# Patient Record
Sex: MALE | Race: WHITE | NOT HISPANIC OR LATINO | Employment: FULL TIME | URBAN - METROPOLITAN AREA
[De-identification: names, ages, dates, MRNs, and addresses within clinical notes are randomized per-mention and may not be internally consistent; named-entity substitution may affect disease eponyms.]

---

## 2024-07-10 ENCOUNTER — HOSPITAL ENCOUNTER (EMERGENCY)
Facility: HOSPITAL | Age: 49
Discharge: HOME | End: 2024-07-11
Payer: COMMERCIAL

## 2024-07-10 VITALS
SYSTOLIC BLOOD PRESSURE: 137 MMHG | HEART RATE: 75 BPM | TEMPERATURE: 97.3 F | HEIGHT: 73 IN | DIASTOLIC BLOOD PRESSURE: 79 MMHG | OXYGEN SATURATION: 99 % | BODY MASS INDEX: 30.48 KG/M2 | WEIGHT: 230 LBS

## 2024-07-10 DIAGNOSIS — M25.572 ACUTE LEFT ANKLE PAIN: Primary | ICD-10-CM

## 2024-07-10 DIAGNOSIS — S93.402A SPRAIN OF LEFT ANKLE, INITIAL ENCOUNTER: ICD-10-CM

## 2024-07-10 PROCEDURE — 99283 EMERGENCY DEPT VISIT LOW MDM: CPT

## 2024-07-10 ASSESSMENT — PAIN DESCRIPTION - PAIN TYPE: TYPE: ACUTE PAIN

## 2024-07-10 ASSESSMENT — PAIN DESCRIPTION - LOCATION: LOCATION: ANKLE

## 2024-07-10 ASSESSMENT — PAIN SCALES - GENERAL: PAINLEVEL_OUTOF10: 10 - WORST POSSIBLE PAIN

## 2024-07-10 ASSESSMENT — COLUMBIA-SUICIDE SEVERITY RATING SCALE - C-SSRS
6. HAVE YOU EVER DONE ANYTHING, STARTED TO DO ANYTHING, OR PREPARED TO DO ANYTHING TO END YOUR LIFE?: NO
2. HAVE YOU ACTUALLY HAD ANY THOUGHTS OF KILLING YOURSELF?: NO
1. IN THE PAST MONTH, HAVE YOU WISHED YOU WERE DEAD OR WISHED YOU COULD GO TO SLEEP AND NOT WAKE UP?: NO

## 2024-07-10 ASSESSMENT — PAIN DESCRIPTION - ORIENTATION: ORIENTATION: LEFT

## 2024-07-10 ASSESSMENT — PAIN - FUNCTIONAL ASSESSMENT: PAIN_FUNCTIONAL_ASSESSMENT: 0-10

## 2024-07-10 NOTE — Clinical Note
Rodolfo Lopez was seen and treated in our emergency department on 7/10/2024.  He may return to work on 07/12/2024.       If you have any questions or concerns, please don't hesitate to call.      Guillermina Roberts, DO

## 2024-07-11 ENCOUNTER — APPOINTMENT (OUTPATIENT)
Dept: RADIOLOGY | Facility: HOSPITAL | Age: 49
End: 2024-07-11
Payer: COMMERCIAL

## 2024-07-11 PROCEDURE — 73610 X-RAY EXAM OF ANKLE: CPT | Mod: LEFT SIDE | Performed by: RADIOLOGY

## 2024-07-11 PROCEDURE — 2500000001 HC RX 250 WO HCPCS SELF ADMINISTERED DRUGS (ALT 637 FOR MEDICARE OP): Performed by: PHYSICIAN ASSISTANT

## 2024-07-11 PROCEDURE — 73610 X-RAY EXAM OF ANKLE: CPT | Mod: LT

## 2024-07-11 RX ORDER — OXYCODONE AND ACETAMINOPHEN 5; 325 MG/1; MG/1
1 TABLET ORAL ONCE
Status: COMPLETED | OUTPATIENT
Start: 2024-07-11 | End: 2024-07-11

## 2024-07-11 RX ADMIN — OXYCODONE HYDROCHLORIDE AND ACETAMINOPHEN 1 TABLET: 5; 325 TABLET ORAL at 01:27

## 2024-07-11 NOTE — ED PROVIDER NOTES
EMERGENCY MEDICINE EVALUATION NOTE    History of Present Illness     Chief Complaint:   Chief Complaint   Patient presents with    Ankle Pain       HPI: Rodolfo Lopez is a 48 y.o. male presents with a chief complaint of fall.  Patient states that he went outside today and slipped on some wet bricks in the front of his house.  He states he fell down approximately 3 steps.  He reports that he did not hit his head.  He states that he scraped his left forearm and rolled his left ankle in the process.  Patient reports that he is having a significant amount of pain over the left lateral ankle.  Patient states he had a difficult time walking on the ankle since the fall secondary to pain.  Denies anything at home for symptoms.  Patient reports that the fall was purely mechanical.  Patient denies any loss of neurovascular status to left lower extremity just swelling along the lateral ankle.    Previous History   No past medical history on file.  No past surgical history on file.     No family history on file.  Allergies   Allergen Reactions    Penicillins Anaphylaxis     No current outpatient medications    Physical Exam     Appearance: Alert, oriented , cooperative,  in no acute distress.      Skin: Several small abrasions along the left forearm.  Dry skin, no lesions, rash, petechiae or purpura.      Eyes: PERRLA, EOMs intact,  Conjunctiva pink      ENT: Hearing grossly intact. Pharynx clear     Neck: Supple. Trachea at midline.      Pulmonary: Clear bilaterally. No rales, rhonchi or wheezing. No accessory muscle use or stridor.     Cardiac: Normal rate and rhythm without murmur     Abdomen: Soft, nontender, active bowel sounds.     Musculoskeletal: Slight decreased plantarflexion of the left foot at the ankle joint secondary to pain and swelling.  Large amount of swelling present over the lateral malleolus.  Full range of motion of left upper extremity.  Neuro vas intact with strong pulses in left upper extremity.    "  Neurological:Cranial nerves II through XII are grossly intact, normal sensation, no weakness, no focal findings identified.     Results   Labs Reviewed - No data to display  XR ankle left 3+ views    (Results Pending)         ED Course & Medical Decision Making     Medications   oxyCODONE-acetaminophen (Percocet) 5-325 mg per tablet 1 tablet (has no administration in time range)     Heart Rate:  [75]   Temperature:  [36.3 °C (97.3 °F)]   BP: (137)/(79)   Height:  [185.4 cm (6' 1\")]   Weight:  [104 kg (230 lb)]   Pulse Ox:  [99 %]    ED Course as of 07/11/24 0123   Thu Jul 11, 2024   0123 Patient signed out to attending physician at shift change pending reevaluation after x-ray imaging. [CJ]      ED Course User Index  [CJ] Carlos Brock PA-C         Diagnoses as of 07/11/24 0123   Acute left ankle pain       Procedures   Procedures    Diagnosis     1. Acute left ankle pain        Disposition   Signed out pending reevaluation after ankle x-ray    ED Prescriptions    None         Disclaimer: This note was dictated by speech recognition. Minor errors in transcription may be present. Please call if questions.       Carlos Brock PA-C  07/11/24 0124    "